# Patient Record
Sex: MALE | NOT HISPANIC OR LATINO | URBAN - METROPOLITAN AREA
[De-identification: names, ages, dates, MRNs, and addresses within clinical notes are randomized per-mention and may not be internally consistent; named-entity substitution may affect disease eponyms.]

---

## 2021-12-21 ENCOUNTER — APPOINTMENT (RX ONLY)
Dept: URBAN - METROPOLITAN AREA CLINIC 369 | Facility: CLINIC | Age: 26
Setting detail: DERMATOLOGY
End: 2021-12-21

## 2021-12-21 DIAGNOSIS — A69.20 LYME DISEASE, UNSPECIFIED: ICD-10-CM

## 2021-12-21 PROCEDURE — 99203 OFFICE O/P NEW LOW 30 MIN: CPT

## 2021-12-21 PROCEDURE — ? COUNSELING

## 2021-12-21 PROCEDURE — ? ORDER TESTS

## 2021-12-21 PROCEDURE — ? TREATMENT REGIMEN

## 2021-12-21 NOTE — PROCEDURE: TREATMENT REGIMEN
Plan: Patient reports history of tick bites when hiking with his dog. He is unsure how long he has had this rash - denies seeing a tick. He was evaluated by the emergency room and completed 2 days of 7 day course of Doxycycline (100mg twice daily). Advised to complete Doxy course, and follow up in office in 1week. Patient is stable and asymptomatic. Advised to monitor for symptoms.
Detail Level: Zone
Discontinue Regimen: Patient denies itch or irritation - may discontinue Triamcinolone

## 2021-12-29 ENCOUNTER — RX ONLY (OUTPATIENT)
Age: 26
Setting detail: RX ONLY
End: 2021-12-29

## 2021-12-29 RX ORDER — DOXYCYCLINE 100 MG/1
CAPSULE ORAL
Qty: 28 | Refills: 0 | Status: ERX | COMMUNITY
Start: 2021-12-29

## 2022-01-04 ENCOUNTER — APPOINTMENT (RX ONLY)
Dept: URBAN - METROPOLITAN AREA CLINIC 369 | Facility: CLINIC | Age: 27
Setting detail: DERMATOLOGY
End: 2022-01-04

## 2022-01-04 DIAGNOSIS — T07XXXA INSECT BITE, NONVENOMOUS, OF OTHER, MULTIPLE, AND UNSPECIFIED SITES, WITHOUT MENTION OF INFECTION: ICD-10-CM | Status: STABLE

## 2022-01-04 PROBLEM — S20.462A INSECT BITE (NONVENOMOUS) OF LEFT BACK WALL OF THORAX, INITIAL ENCOUNTER: Status: ACTIVE | Noted: 2022-01-04

## 2022-01-04 PROCEDURE — 99213 OFFICE O/P EST LOW 20 MIN: CPT

## 2022-01-04 PROCEDURE — ? TREATMENT REGIMEN

## 2022-01-04 PROCEDURE — ? ORDER TESTS

## 2022-01-04 PROCEDURE — ? COUNSELING

## 2022-01-04 ASSESSMENT — LOCATION DETAILED DESCRIPTION DERM: LOCATION DETAILED: LEFT MID-UPPER BACK

## 2022-01-04 ASSESSMENT — LOCATION SIMPLE DESCRIPTION DERM: LOCATION SIMPLE: LEFT UPPER BACK

## 2022-01-04 ASSESSMENT — LOCATION ZONE DERM: LOCATION ZONE: TRUNK

## 2022-01-04 NOTE — PROCEDURE: TREATMENT REGIMEN
Plan: Patient reports history of tick bites when hiking with his dog on December 19th, 2021. He is unsure how long he has had this rash - denies seeing a tick. He was evaluated by the emergency room and completed 2 days of 7 day course of Doxycycline (100mg twice daily)\\n\\nPatient was given another 2 weeks course of doxycycline 100mg twice a day with food and water\\n\\nPlan to have repeat blood work done in 4-6 weeks \\n\\nTick bite lesion is not present today.\\n\\nPatient reports the following symptoms: fatigue, lack of sleep, and stress. Discussed that patient’s symptoms could be due to outside factors \\n\\n\\nConsider a longer course of doxycycline if not improved and switch care to infectious disease
Discontinue Regimen: Patient denies itch or irritation - may discontinue Triamcinolone
Detail Level: Zone